# Patient Record
Sex: MALE | Race: WHITE | URBAN - METROPOLITAN AREA
[De-identification: names, ages, dates, MRNs, and addresses within clinical notes are randomized per-mention and may not be internally consistent; named-entity substitution may affect disease eponyms.]

---

## 2020-11-09 ENCOUNTER — PRE VISIT (OUTPATIENT)
Dept: ORTHOPEDICS | Facility: CLINIC | Age: 60
End: 2020-11-09

## 2020-11-09 ENCOUNTER — OFFICE VISIT (OUTPATIENT)
Dept: ORTHOPEDICS | Facility: CLINIC | Age: 60
End: 2020-11-09
Payer: COMMERCIAL

## 2020-11-09 ENCOUNTER — ANCILLARY PROCEDURE (OUTPATIENT)
Dept: GENERAL RADIOLOGY | Facility: CLINIC | Age: 60
End: 2020-11-09
Attending: ORTHOPAEDIC SURGERY
Payer: COMMERCIAL

## 2020-11-09 VITALS — HEIGHT: 68 IN | BODY MASS INDEX: 28.79 KG/M2 | WEIGHT: 190 LBS

## 2020-11-09 DIAGNOSIS — M25.559 HIP PAIN: Primary | ICD-10-CM

## 2020-11-09 DIAGNOSIS — M25.559 HIP PAIN: ICD-10-CM

## 2020-11-09 DIAGNOSIS — M70.61 GREATER TROCHANTERIC BURSITIS OF RIGHT HIP: ICD-10-CM

## 2020-11-09 PROCEDURE — 99202 OFFICE O/P NEW SF 15 MIN: CPT | Mod: 25 | Performed by: ORTHOPAEDIC SURGERY

## 2020-11-09 PROCEDURE — 20610 DRAIN/INJ JOINT/BURSA W/O US: CPT | Mod: RT | Performed by: NURSE PRACTITIONER

## 2020-11-09 PROCEDURE — 73522 X-RAY EXAM HIPS BI 3-4 VIEWS: CPT | Mod: GC | Performed by: RADIOLOGY

## 2020-11-09 RX ORDER — MULTIVITAMIN
1 CAPSULE ORAL
COMMUNITY

## 2020-11-09 RX ORDER — ASPIRIN 81 MG/1
81 TABLET, CHEWABLE ORAL
COMMUNITY
Start: 2020-06-16

## 2020-11-09 RX ORDER — CARVEDILOL 12.5 MG/1
TABLET ORAL
COMMUNITY
Start: 2020-09-30

## 2020-11-09 RX ADMIN — LIDOCAINE HYDROCHLORIDE 2 ML: 10 INJECTION, SOLUTION EPIDURAL; INFILTRATION; INTRACAUDAL; PERINEURAL at 11:49

## 2020-11-09 RX ADMIN — TRIAMCINOLONE ACETONIDE 40 MG: 40 INJECTION, SUSPENSION INTRA-ARTICULAR; INTRAMUSCULAR at 11:49

## 2020-11-09 ASSESSMENT — MIFFLIN-ST. JEOR: SCORE: 1651.33

## 2020-11-09 NOTE — NURSING NOTE
"Reason For Visit:   Chief Complaint   Patient presents with     Consult     Right hip pain        Ht 1.727 m (5' 8\")   Wt 86.2 kg (190 lb)   BMI 28.89 kg/m      Pain Assessment  Patient Currently in Pain: Yes  0-10 Pain Scale: 1  Primary Pain Location: Hip    Elsie Drew, ATC    "

## 2020-11-09 NOTE — TELEPHONE ENCOUNTER
DIAGNOSIS: Joint pain   APPOINTMENT DATE: 11.9.20   NOTES STATUS DETAILS   OFFICE NOTE from referring provider N/A    OFFICE NOTE from other specialist N/A    DISCHARGE SUMMARY from hospital N/A    DISCHARGE REPORT from the ER N/A    OPERATIVE REPORT N/A    MEDICATION LIST N/A    EMG (for Spine) N/A    IMPLANT RECORD/STICKER N/A    LABS     CBC/DIFF N/A    CULTURES N/A    INJECTIONS DONE IN RADIOLOGY N/A    MRI N/A    CT SCAN N/A    XRAYS (IMAGES & REPORTS) N/A    TUMOR     PATHOLOGY  Slides & report N/A

## 2020-11-09 NOTE — PROGRESS NOTES
Large Joint Injection/Arthocentesis: R greater trochanteric bursa    Date/Time: 2020 11:49 AM  Performed by: Pepper Solomon APRN CNP  Authorized by: Orlando Soto MD     Indications:  Pain  Needle Size:  25 G  Guidance: landmark guided    Location:  Hip      Site:  R greater trochanteric bursa  Medications:  40 mg triamcinolone 40 MG/ML; 2 mL lidocaine (PF) 1 %  Outcome:  Tolerated well, no immediate complications  Procedure discussed: discussed risks, benefits, and alternatives    Consent Given by:  Patient  Timeout: timeout called immediately prior to procedure    Prep: patient was prepped and draped in usual sterile fashion          [facility] [address]  [address] [facility] [id number] [phone]  Dept: [phone]  ______________________________________________________________________________    Patient: Jean Pierre Mena   : 1960   MRN: [id number]   2020    INVASIVE PROCEDURE SAFETY CHECKLIST    Date: 2020   Procedure: right greater trochanteric bursa injection   Patient Name: Jean Pierre Mena  MRN: [id number]  YOB: 1960    Action: Complete sections as appropriate. Any discrepancy results in a HARD COPY until resolved.     PRE PROCEDURE:  Patient ID verified with 2 identifiers (name and  or MRN): Yes  Procedure and site verified with patient/designee (when able): Yes  Accurate consent documentation in medical record: Yes  H&P (or appropriate assessment) documented in medical record: NA  H&P must be up to 20 days prior to procedure and updates within 24 hours of procedure as applicable: NA  Relevant diagnostic and radiology test results appropriately labeled and displayed as applicable: Yes  Procedure site(s) marked with provider initials: Yes    TIMEOUT:  Time-Out performed immediately prior to starting procedure, including verbal and active participation of all team members addressing the  following:Yes  * Correct patient identify  * Confirmed that the correct side and site are marked  * An accurate procedure consent form  * Agreement on the procedure to be done  * Correct patient position  * Relevant images and results are properly labeled and appropriately displayed  * The need to administer antibiotics or fluids for irrigation purposes during the procedure as applicable   * Safety precautions based on patient history or medication use    DURING PROCEDURE: Verification of correct person, site, and procedures any time the responsibility for care of the patient is transferred to another member of the care team.       The following medications were given:         Prior to injection, verified patient identity using patient's name and date of birth.  Due to injection administration, patient instructed to remain in clinic for 15 minutes  afterwards, and to report any adverse reaction to me immediately.    Bursa injection was performed.    Medication Name: lidocaine NDC 63351-128-55  Drug Amount Wasted:  Yes: 3 mg/ml   Vial/Syringe: Single dose vial  Expiration Date:  4/24    Medication Name Kenalog NDC [address]-9    Scribed by Elsie Drew ATC for Pepper Solomon on November 9, 2020 at 11:40 based on the provider's statements to me.     Elsie Drew ATC      I, Pepper Solomon, APRN, CNP, agree with above documentation.

## 2020-11-09 NOTE — LETTER
Date:November 13, 2020      Provider requested that no letter be sent. Do not send.       [facility] Health Information

## 2020-11-09 NOTE — LETTER
2020         RE: Jean Pierre Mena  [address]  [facility] [address]        Dear Colleague,    Thank you for referring your patient, Jean Pierre Mena, to the [facility]. Please see a copy of my visit note below.    Large Joint Injection/Arthocentesis: R greater trochanteric bursa    Date/Time: 2020 11:49 AM  Performed by: Pepper Solomon APRN CNP  Authorized by: Orlando Soto MD     Indications:  Pain  Needle Size:  25 G  Guidance: landmark guided    Location:  Hip      Site:  R greater trochanteric bursa  Medications:  40 mg triamcinolone 40 MG/ML; 2 mL lidocaine (PF) 1 %  Outcome:  Tolerated well, no immediate complications  Procedure discussed: discussed risks, benefits, and alternatives    Consent Given by:  Patient  Timeout: timeout called immediately prior to procedure    Prep: patient was prepped and draped in usual sterile fashion          [facility] [address] [address] [phone]  Dept: [phone]  ______________________________________________________________________________    Patient: Jean Pierre Mena   : 1960   MRN: [id number]   2020    INVASIVE PROCEDURE SAFETY CHECKLIST    Date: 2020   Procedure: right greater trochanteric bursa injection   Patient Name: Jean Pierre Mena  MRN: [id number]  YOB: 1960    Action: Complete sections as appropriate. Any discrepancy results in a HARD COPY until resolved.     PRE PROCEDURE:  Patient ID verified with 2 identifiers (name and  or MRN): Yes  Procedure and site verified with patient/designee (when able): Yes  Accurate consent documentation in medical record: Yes  H&P (or appropriate assessment) documented in medical record: NA  H&P must be up to 20 days prior to procedure and updates within 24 hours of procedure as applicable: NA  Relevant diagnostic and radiology test results appropriately  labeled and displayed as applicable: Yes  Procedure site(s) marked with provider initials: Yes    TIMEOUT:  Time-Out performed immediately prior to starting procedure, including verbal and active participation of all team members addressing the following:Yes  * Correct patient identify  * Confirmed that the correct side and site are marked  * An accurate procedure consent form  * Agreement on the procedure to be done  * Correct patient position  * Relevant images and results are properly labeled and appropriately displayed  * The need to administer antibiotics or fluids for irrigation purposes during the procedure as applicable   * Safety precautions based on patient history or medication use    DURING PROCEDURE: Verification of correct person, site, and procedures any time the responsibility for care of the patient is transferred to another member of the care team.       The following medications were given:         Prior to injection, verified patient identity using patient's name and date of birth.  Due to injection administration, patient instructed to remain in clinic for 15 minutes  afterwards, and to report any adverse reaction to me immediately.    Bursa injection was performed.    Medication Name: lidocaine NDC 93462-583-46  Drug Amount Wasted:  Yes: 3 mg/ml   Vial/Syringe: Single dose vial  Expiration Date:  4/24    Medication Name Kenalog NDC [address]-8    Scribed by Elsie Drew ATC for Pepper Solomon on November 9, 2020 at 11:40 based on the provider's statements to me.     Elsie Drew ATC          Service Date: 11/09/2020      CHIEF COMPLAINT:  Right-sided lateral hip pain for 2 years.      PAST MEDICAL HISTORY:  Manuel is in good health other than observational treatment for aortic root dilatation.  He takes an hypertensive for this and also watches his diet and weight control.      HISTORY OF PRESENT ILLNESS:  He reports a 2-year history of insidious onset right lateral hip pain.  This pain  is concentrated over the prominence of his greater trochanter and particularly bad when his lower back flares.  He notes stiffness and lower back pain and occasionally has left-sided posterolateral buttock and thigh pain, not extending beyond his knee.  He stretches his back regularly and lifts weights and is in a walking program.      PHYSICAL EXAMINATION:  He is alert and oriented x3.  His affect is normal.  His gait pattern is normal.  His lumbar flexibility is normal.  He has a nontender spine.  He is tender over the right greater trochanter.  Neurocirculatory status is intact.  No sciatic stretch signs are present.  Hip rotation is normal and symmetric.        IMAGING:  X-ray:  AP pelvis, lateral of each hip shows what appears to be normal hip joint clefts.  There is sclerosis on the right side at L5-S1 at the facet level and at L4-L5.  The plain x-ray also shows a small lucent area in the left iliac wing and the right iliac wing and a lucent area in the right femoral neck with surrounding sclerosis.      ASSESSMENT:   1.  Right trochanteric bursitis.   2.  Lumbar degenerative facet arthrosis.   3.  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      TREATMENT:     1.  Right greater trochanter bursal injection.   2.  Possible serum protein electrophoresis, erythrocyte, sedimentation rate and serum test for light chain disease.  We will first evaluate the results of the bursal injection.         MYESHA SOTO MD             D: 2020   T: 2020   MT: marlen      Name:     BEATA BOCANEGRA   MRN:      [id number]        Account:      [id number]   :      1960           Service Date: 2020      Document: [id number]        Again, thank you for allowing me to participate in the care of your patient.        Sincerely,        Myesha Soto MD

## 2020-11-09 NOTE — PROGRESS NOTES
Service Date: 11/09/2020      CHIEF COMPLAINT:  Right-sided lateral hip pain for 2 years.      PAST MEDICAL HISTORY:  Manuel is in good health other than observational treatment for aortic root dilatation.  He takes an hypertensive for this and also watches his diet and weight control.      HISTORY OF PRESENT ILLNESS:  He reports a 2-year history of insidious onset right lateral hip pain.  This pain is concentrated over the prominence of his greater trochanter and particularly bad when his lower back flares.  He notes stiffness and lower back pain and occasionally has left-sided posterolateral buttock and thigh pain, not extending beyond his knee.  He stretches his back regularly and lifts weights and is in a walking program.      PHYSICAL EXAMINATION:  He is alert and oriented x3.  His affect is normal.  His gait pattern is normal.  His lumbar flexibility is normal.  He has a nontender spine.  He is tender over the right greater trochanter.  Neurocirculatory status is intact.  No sciatic stretch signs are present.  Hip rotation is normal and symmetric.        IMAGING:  X-ray:  AP pelvis, lateral of each hip shows what appears to be normal hip joint clefts.  There is sclerosis on the right side at L5-S1 at the facet level and at L4-L5.  The plain x-ray also shows a small lucent area in the left iliac wing and the right iliac wing and a lucent area in the right femoral neck with surrounding sclerosis.      ASSESSMENT:   1.  Right trochanteric bursitis.   2.  Lumbar degenerative facet arthrosis.   3.  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      TREATMENT:     1.  Right greater trochanter bursal injection.   2.  Possible serum protein electrophoresis, erythrocyte, sedimentation rate and serum test for light chain disease.  We will first evaluate the results of the bursal injection.         MYESHA CLAROS MD             D: 11/09/2020   T: 11/09/2020   MT: marlen      Name:     BEATA BOCANEGRA   MRN:       [id number]        Account:      [id number]   :      1960           Service Date: 2020      Document: [id number]    Pepper DOS SANTOS APRN, CNP agree with above documentation.

## 2020-11-11 RX ORDER — LIDOCAINE HYDROCHLORIDE 10 MG/ML
2 INJECTION, SOLUTION EPIDURAL; INFILTRATION; INTRACAUDAL; PERINEURAL
Status: SHIPPED | OUTPATIENT
Start: 2020-11-09

## 2020-11-11 RX ORDER — TRIAMCINOLONE ACETONIDE 40 MG/ML
40 INJECTION, SUSPENSION INTRA-ARTICULAR; INTRAMUSCULAR
Status: SHIPPED | OUTPATIENT
Start: 2020-11-09

## (undated) RX ORDER — TRIAMCINOLONE ACETONIDE 40 MG/ML
INJECTION, SUSPENSION INTRA-ARTICULAR; INTRAMUSCULAR
Status: DISPENSED
Start: 2020-11-09

## (undated) RX ORDER — LIDOCAINE HYDROCHLORIDE 10 MG/ML
INJECTION, SOLUTION EPIDURAL; INFILTRATION; INTRACAUDAL; PERINEURAL
Status: DISPENSED
Start: 2020-11-09